# Patient Record
Sex: FEMALE | Race: WHITE | NOT HISPANIC OR LATINO | Employment: OTHER | URBAN - METROPOLITAN AREA
[De-identification: names, ages, dates, MRNs, and addresses within clinical notes are randomized per-mention and may not be internally consistent; named-entity substitution may affect disease eponyms.]

---

## 2021-05-04 ENCOUNTER — RECORDS - HEALTHEAST (OUTPATIENT)
Dept: LAB | Facility: CLINIC | Age: 86
End: 2021-05-04

## 2021-05-05 LAB
ANION GAP SERPL CALCULATED.3IONS-SCNC: 9 MMOL/L (ref 5–18)
BUN SERPL-MCNC: 34 MG/DL (ref 8–28)
CALCIUM SERPL-MCNC: 9.6 MG/DL (ref 8.5–10.5)
CHLORIDE BLD-SCNC: 103 MMOL/L (ref 98–107)
CO2 SERPL-SCNC: 31 MMOL/L (ref 22–31)
CREAT SERPL-MCNC: 0.81 MG/DL (ref 0.6–1.1)
ERYTHROCYTE [DISTWIDTH] IN BLOOD BY AUTOMATED COUNT: 12.4 % (ref 11–14.5)
GFR SERPL CREATININE-BSD FRML MDRD: >60 ML/MIN/1.73M2
GLUCOSE BLD-MCNC: 106 MG/DL (ref 70–125)
HBA1C MFR BLD: 5.7 %
HCT VFR BLD AUTO: 42 % (ref 35–47)
HGB BLD-MCNC: 13.5 G/DL (ref 12–16)
MCH RBC QN AUTO: 31.8 PG (ref 27–34)
MCHC RBC AUTO-ENTMCNC: 32.1 G/DL (ref 32–36)
MCV RBC AUTO: 99 FL (ref 80–100)
PLATELET # BLD AUTO: 255 THOU/UL (ref 140–440)
PMV BLD AUTO: 10 FL (ref 8.5–12.5)
POTASSIUM BLD-SCNC: 4 MMOL/L (ref 3.5–5)
RBC # BLD AUTO: 4.24 MILL/UL (ref 3.8–5.4)
SODIUM SERPL-SCNC: 143 MMOL/L (ref 136–145)
WBC: 5.8 THOU/UL (ref 4–11)

## 2021-05-25 ENCOUNTER — RECORDS - HEALTHEAST (OUTPATIENT)
Dept: ADMINISTRATIVE | Facility: CLINIC | Age: 86
End: 2021-05-25

## 2021-05-26 ENCOUNTER — RECORDS - HEALTHEAST (OUTPATIENT)
Dept: ADMINISTRATIVE | Facility: CLINIC | Age: 86
End: 2021-05-26

## 2021-05-27 ENCOUNTER — RECORDS - HEALTHEAST (OUTPATIENT)
Dept: ADMINISTRATIVE | Facility: CLINIC | Age: 86
End: 2021-05-27

## 2021-10-08 ENCOUNTER — HOSPITAL ENCOUNTER (EMERGENCY)
Facility: HOSPITAL | Age: 86
Discharge: HOME | End: 2021-10-09
Attending: EMERGENCY MEDICINE | Admitting: EMERGENCY MEDICINE
Payer: COMMERCIAL

## 2021-10-08 ENCOUNTER — APPOINTMENT (OUTPATIENT)
Dept: RADIOLOGY | Facility: HOSPITAL | Age: 86
End: 2021-10-08
Attending: EMERGENCY MEDICINE
Payer: COMMERCIAL

## 2021-10-08 ENCOUNTER — APPOINTMENT (OUTPATIENT)
Dept: CT IMAGING | Facility: HOSPITAL | Age: 86
End: 2021-10-08
Attending: EMERGENCY MEDICINE
Payer: COMMERCIAL

## 2021-10-08 DIAGNOSIS — S32.000A COMPRESSION FRACTURE OF LUMBAR VERTEBRA, UNSPECIFIED LUMBAR VERTEBRAL LEVEL, INITIAL ENCOUNTER (H): ICD-10-CM

## 2021-10-08 DIAGNOSIS — R29.6 FREQUENT FALLS: ICD-10-CM

## 2021-10-08 LAB
ANION GAP SERPL CALCULATED.3IONS-SCNC: 11 MMOL/L (ref 5–18)
APTT PPP: 33 SECONDS (ref 22–38)
BUN SERPL-MCNC: 28 MG/DL (ref 8–28)
CALCIUM SERPL-MCNC: 9.5 MG/DL (ref 8.5–10.5)
CHLORIDE BLD-SCNC: 102 MMOL/L (ref 98–107)
CO2 SERPL-SCNC: 29 MMOL/L (ref 22–31)
CREAT SERPL-MCNC: 0.85 MG/DL (ref 0.6–1.1)
ERYTHROCYTE [DISTWIDTH] IN BLOOD BY AUTOMATED COUNT: 12 % (ref 10–15)
GFR SERPL CREATININE-BSD FRML MDRD: 62 ML/MIN/1.73M2
GLUCOSE BLD-MCNC: 111 MG/DL (ref 70–125)
HCT VFR BLD AUTO: 39 % (ref 35–47)
HGB BLD-MCNC: 12.5 G/DL (ref 11.7–15.7)
INR PPP: 1 (ref 0.9–1.15)
MCH RBC QN AUTO: 33.2 PG (ref 26.5–33)
MCHC RBC AUTO-ENTMCNC: 32.1 G/DL (ref 31.5–36.5)
MCV RBC AUTO: 103 FL (ref 78–100)
PLATELET # BLD AUTO: 266 10E3/UL (ref 150–450)
POTASSIUM BLD-SCNC: 4.4 MMOL/L (ref 3.5–5)
RBC # BLD AUTO: 3.77 10E6/UL (ref 3.8–5.2)
SODIUM SERPL-SCNC: 142 MMOL/L (ref 136–145)
WBC # BLD AUTO: 8.3 10E3/UL (ref 4–11)

## 2021-10-08 PROCEDURE — 85610 PROTHROMBIN TIME: CPT | Performed by: EMERGENCY MEDICINE

## 2021-10-08 PROCEDURE — 36415 COLL VENOUS BLD VENIPUNCTURE: CPT | Performed by: EMERGENCY MEDICINE

## 2021-10-08 PROCEDURE — 99285 EMERGENCY DEPT VISIT HI MDM: CPT | Mod: 25

## 2021-10-08 PROCEDURE — 80048 BASIC METABOLIC PNL TOTAL CA: CPT | Performed by: EMERGENCY MEDICINE

## 2021-10-08 PROCEDURE — 85730 THROMBOPLASTIN TIME PARTIAL: CPT | Performed by: EMERGENCY MEDICINE

## 2021-10-08 PROCEDURE — 73610 X-RAY EXAM OF ANKLE: CPT | Mod: RT

## 2021-10-08 PROCEDURE — 70450 CT HEAD/BRAIN W/O DYE: CPT

## 2021-10-08 PROCEDURE — 72192 CT PELVIS W/O DYE: CPT

## 2021-10-08 PROCEDURE — 72125 CT NECK SPINE W/O DYE: CPT

## 2021-10-08 PROCEDURE — 72131 CT LUMBAR SPINE W/O DYE: CPT

## 2021-10-08 PROCEDURE — 73502 X-RAY EXAM HIP UNI 2-3 VIEWS: CPT

## 2021-10-08 PROCEDURE — 85041 AUTOMATED RBC COUNT: CPT | Performed by: EMERGENCY MEDICINE

## 2021-10-08 ASSESSMENT — MIFFLIN-ST. JEOR: SCORE: 1157.1

## 2021-10-08 NOTE — ED PROVIDER NOTES
"  Emergency Department Encounter     Evaluation Date & Time:   10/8/2021  4:14 PM    CHIEF COMPLAINT:  Fall and Right hip pain      Triage Note:Pt arrives from [facility] via [facility]. Per medics staff heard fall and came to pt's side right away. Pt was supine with \"head against wall\" and \"alert to baseline\" per staff. Pt c/o of right hip pain. Pt is not on thinners per medics. Fall was around 1500      Impression and Plan       FINAL IMPRESSION:    ICD-10-CM    1. Frequent falls  R29.6    2. Compression fracture of lumbar vertebra, unspecified lumbar vertebral level, initial encounter (H)  S32.000A          ED COURSE & MEDICAL DECISION MAKIN:28 PM Met with patient for initial interview and exam. Discussed initial plan for care for their stay in the emergency department.  9:06 PM Consulted with Dr. Kim, general surgery.    87 year old female, history of Alzheimer's dementia with behavioral disturbance, HTN, gastric ulcer and osteopenia, who presents via EMS from her memory care facility for evaluation after a fall.  Patient unable to provide any history secondary to dementia. Per EMS, staff at her facility heard her fall ~1 hour ago. They went to help her immediately and it did not appear as if she lost consciousness and she was at her baseline mental status. Her behavior suggested right hip pain. She is not on any blood thinners.    On exam, no signs of injury. Grossly non-focal neuro exam, however she is not cooperative with complete neuro exam. She has midline tenderness to palpation of entire cervical spine.    Precautionary head CT performed and demonstrated no evidence for acute intracranial process with brain atrophy and presumed chronic microvascular ischemic changes.  Cervical spine CT negative for fracture and posttraumatic subluxation with no high-grade spinal canal or neural foraminal stenosis.    Right hip x-ray negative for fracture and dislocation with osteopenia.      After negative " imaging, we attempted to ambulate the patient, however she would not walk.  Noncontrast CT of the pelvis performed to evaluate for occult fracture and was negative.    Labs remarkable for no leukocytosis, anemia, significant electrolyte derangements or renal impairment.  Rosi MAGUIRE    RN spoke with staff at her facility who reported that since her fall on Monday, she has not been ambulatory, however had been walking prior to that fall.  If her evaluation is negative, they are comfortable taking the patient back.    Given that patient has not been ambulatory since her fall, will obtain CT of the lumbar spine as well as BL ankle x-rays, which are pending at time of shift change.    Patient signed out to Dr. Carvajal at time of shift change to review remaining imaging studies.  If these images are negative, patient to be transferred back to her memory care facility with follow-up PMD.  Patient has been stable thus far in ED course.        MEDICATIONS GIVEN IN THE EMERGENCY DEPARTMENT:  Medications - No data to display    NEW PRESCRIPTIONS STARTED AT TODAY'S ED VISIT:  There are no discharge medications for this patient.      HPI     Shea León is a 87 year old female with a pertinent history of Alzheimer's dementia with behavioral disturbance, HTN, gastric ulcer and osteopenia, who presents to this ED via EMS from her [facility] care facility for evaluation of hip pain after a fall.    HPI limited secondary to dementia.    Per EMS, patient arrives from her memory care facility where staff heard her fall around 3 PM (~1 hour ago). Staff assisted her immediately got to her right away and it did not appear as if she lost consciousness, and she was at her mental baseline. She did complain of right hip pain at this time. She is not on any blood thinners.    Patient also had a fall on Monday and she has not been ambulating since the fall - had been ambulatory prior to that fall.     REVIEW OF SYSTEMS:  ROS limited  "secondary to dementia.      Medical History     No past medical history on file.    No past surgical history on file.    No family history on file.    Social History     Tobacco Use     Smoking status: Not on file   Substance Use Topics     Alcohol use: Not on file     Drug use: Not on file       No current outpatient medications on file.      Physical Exam     First Vitals:  Patient Vitals for the past 24 hrs:   BP Temp Temp src Pulse Resp SpO2 Height Weight   10/09/21 0045 107/73 -- -- -- -- -- -- --   10/08/21 2145 -- -- -- 84 -- 96 % -- --   10/08/21 2100 -- -- -- 77 -- 96 % -- --   10/08/21 1915 -- -- -- 74 -- 97 % -- --   10/08/21 1900 -- -- -- 74 -- 97 % -- --   10/08/21 1845 -- -- -- 78 -- 97 % -- --   10/08/21 1830 -- -- -- 81 -- 97 % -- --   10/08/21 1619 (!) 145/76 97.7  F (36.5  C) Oral 65 16 97 % 1.753 m (5' 9\") 65.8 kg (145 lb)       PHYSICAL EXAM:   Physical Exam    GENERAL: Awake, alert.  In no acute distress.   HEENT: Normocephalic, atraumatic. Pupils equal, round and reactive. Conjunctiva normal.  NECK: There is midline tenderness to palpation of entire cervical spine. No stridor.  PULMONARY: Chest is atraumatic and non-tender to palpation. Symmetrical breath sounds without distress.  Lungs clear to auscultation bilaterally without wheezes, rhonchi or rales.  CARDIO: Regular rate and rhythm.  No significant murmur, rub or gallop.  Radial and pedal pulses strong and symmetrical.  ABDOMINAL: Abdomen atraumatic, soft, non-distended and non-tender to palpation.  No CVAT, BL.  BACK:  Back is atraumatic. No midline tenderness to palpation of thoracic and lumbar spines.   EXTREMITIES: No lower extremity swelling or edema.      NEURO: Alert and oriented to person.  Patient not cooperative with neuro exam.  Cranial nerves grossly intact.  No focal motor deficit.  PSYCH: Pleasant, not combative.   SKIN: No rashes.     Results     LAB:  All pertinent labs reviewed and interpreted  Labs Ordered and Resulted " from Time of ED Arrival Up to the Time of Departure from the ED   CBC WITH PLATELETS - Abnormal; Notable for the following components:       Result Value    RBC Count 3.77 (*)      (*)     MCH 33.2 (*)     All other components within normal limits   BASIC METABOLIC PANEL - Normal   INR - Normal   PARTIAL THROMBOPLASTIN TIME - Normal   MAY SALINE LOCK IV       RADIOLOGY:  XR Ankle Left G/E 3 Views   Final Result   IMPRESSION: Bones appear osteopenic. No fracture. No dislocation. Mild soft tissue swelling. Severe arterial calcification.      Ankle XR, G/E 3 views, right   Final Result   IMPRESSION: Bones are osteopenic. No fracture. Normal alignment. Severe arterial calcification.      Lumbar spine CT w/o contrast   Final Result   IMPRESSION:   1.  Mild acute/early subacute compression fracture at the superior endplate of L2 with less than 20% loss of height.   2.  Grade 1 degenerative anterolisthesis of L4 on L5 with moderate central canal stenosis.      CT Pelvis Bone wo Contrast   Final Result   IMPRESSION:   1.  Right hip is negative for fracture.      XR Pelvis w Hip Right G/E 2 Views   Final Result   IMPRESSION: No fracture or dislocation. Osteopenia. Mild degenerative changes in the right hip.      Cervical spine CT w/o contrast   Final Result   IMPRESSION:   1.  No fracture or posttraumatic subluxation.   2.  No high grade spinal canal or neural foraminal stenosis.         Head CT w/o contrast   Final Result   IMPRESSION:   1.  No CT evidence for acute intracranial process.   2.  Brain atrophy and presumed chronic microvascular ischemic changes as above.            I, Valeria Borja, am serving as a scribe to document services personally performed by Susan Leal MD based on my observation and the provider's statements to me. I, Susan Leal MD attest that Valeria Borja is acting in a scribe capacity, has observed my performance of the services and has documented them in accordance with my  direction.    Susan Leal MD  Emergency Medicine  [facility] EMERGENCY DEPARTMENT         Susan Leal MD  10/09/21 0992

## 2021-10-08 NOTE — ED TRIAGE NOTES
"Pt arrives from [facility] via [facility]. Per medics staff heard fall and came to pt's side right away. Pt was supine with \"head against wall\" and \"alert to baseline\" per staff. Pt c/o of right hip pain. Pt is not on thinners per medics. Fall was around 1500  "

## 2021-10-09 VITALS
HEIGHT: 69 IN | BODY MASS INDEX: 21.48 KG/M2 | HEART RATE: 84 BPM | SYSTOLIC BLOOD PRESSURE: 107 MMHG | OXYGEN SATURATION: 96 % | RESPIRATION RATE: 16 BRPM | DIASTOLIC BLOOD PRESSURE: 73 MMHG | WEIGHT: 145 LBS | TEMPERATURE: 97.7 F

## 2021-10-09 NOTE — ED NOTES
Called and spoke with Tess at [facility]. Tess reports that patient was ambulatory prior to her fall on Monday 10/4/21, since Monday NH staff has been using a lift device to help with transfers. Provider notified.

## 2021-10-09 NOTE — DISCHARGE INSTRUCTIONS
You were found to have a mild compression fracture of one of the bones in your low back.  Continue your previously prescribed pain medications and follow-up with your primary care doctor as an outpatient.  Return to the ER if you have any worsening symptoms or other concerns.

## 2021-10-09 NOTE — ED PROVIDER NOTES
10:45 PM: Assumed patient care.  Awaiting imaging results.  Plan will likely be for discharge home barring any major findings on her imaging results.  Her nursing home has already stated that they are willing to accept her back.    11:29 PM  The patient was found to have a small acute compression fracture.  She already has daily pain medications prescribed at her nursing facility.  She appears comfortable at rest and I feel that she can still be discharged back home with instructions for outpatient follow-up.        Imaging:  XR Pelvis w Hip Right G/E 2 Views    Result Date: 10/8/2021  EXAM: XR PELVIS AND HIP RIGHT 2 VIEWS LOCATION: [facility] DATE/TIME: 10/8/2021 5:35 PM INDICATION: ? right hip pain after fall COMPARISON: None.     IMPRESSION: No fracture or dislocation. Osteopenia. Mild degenerative changes in the right hip.    XR Ankle Left G/E 3 Views    Result Date: 10/8/2021  EXAM: XR ANKLE LEFT G/E 3 VIEWS LOCATION: [facility] DATE/TIME: 10/8/2021 10:43 PM INDICATION: frequent falls, now not walking COMPARISON: None.     IMPRESSION: Bones appear osteopenic. No fracture. No dislocation. Mild soft tissue swelling. Severe arterial calcification.    Ankle XR, G/E 3 views, right    Result Date: 10/8/2021  EXAM: XR ANKLE RIGHT G/E 3 VIEWS LOCATION: [facility] DATE/TIME: 10/8/2021 10:43 PM INDICATION: frequent falls, now not walking COMPARISON: None.     IMPRESSION: Bones are osteopenic. No fracture. Normal alignment. Severe arterial calcification.    Cervical spine CT w/o contrast    Result Date: 10/8/2021  EXAM: CT CERVICAL SPINE W/O CONTRAST LOCATION: [facility] DATE/TIME: 10/8/2021 5:18 PM INDICATION: Fall. Trauma. Pain. COMPARISON: None. TECHNIQUE: Routine CT Cervical Spine without IV contrast. Multiplanar reformats. Dose reduction techniques were used. FINDINGS: VERTEBRA: There is minimal degenerative  retrolisthesis of C2 upon C3 and C6 upon C7. There is minimal degenerative anterolisthesis of C4 upon C5, C5 upon C6, and C7 upon T1. Vertebral body heights normal. No fracture or posttraumatic subluxation. Moderate facet arthropathy throughout the cervical spine. No prevertebral soft tissue swelling. CANAL/FORAMINA: No canal or neural foraminal stenosis. PARASPINAL: No extraspinal abnormality.     IMPRESSION: 1.  No fracture or posttraumatic subluxation. 2.  No high grade spinal canal or neural foraminal stenosis.     Head CT w/o contrast    Result Date: 10/8/2021  EXAM: CT HEAD W/O CONTRAST LOCATION: [facility] DATE/TIME: 10/8/2021 5:18 PM INDICATION: Fall. Head trauma. Pain. COMPARISON: Head CT 10/11/2016. TECHNIQUE: Routine CT Head without IV contrast. Multiplanar reformats. Dose reduction techniques were used. FINDINGS: INTRACRANIAL CONTENTS: No intracranial hemorrhage, extraaxial collection, or mass effect. No CT evidence of acute infarct. Moderate presumed chronic small vessel ischemic changes. Moderate generalized volume loss. No hydrocephalus. VISUALIZED ORBITS/SINUSES/MASTOIDS: No intraorbital abnormality. No paranasal sinus mucosal disease. No middle ear or mastoid effusion. BONES/SOFT TISSUES: No acute abnormality.     IMPRESSION: 1.  No CT evidence for acute intracranial process. 2.  Brain atrophy and presumed chronic microvascular ischemic changes as above.    Lumbar spine CT w/o contrast    Result Date: 10/8/2021  EXAM: CT LUMBAR SPINE W/O CONTRAST LOCATION: [facility] DATE/TIME: 10/8/2021 10:19 PM INDICATION: Frequent falls. Now cannot walk COMPARISON: Pelvic CT 10/8/2021 TECHNIQUE: Routine CT Lumbar Spine without IV contrast. Multiplanar reformats. Dose reduction techniques were used. FINDINGS: VERTEBRA: 5 lumbar type vertebra. There is a mild compression deformity at the superior endplate of L2 with less than 20% loss of height. This appears recent.  Vertebral body height is otherwise normal. 8mm degenerative anterolisthesis of L4 on L5. Alignment is otherwise preserved. No evidence for acute subluxation. CANAL/FORAMINA: Moderate central canal stenosis at L4-5. No gross high-grade neural foraminal narrowing. PARASPINAL: No extraspinal abnormality.     IMPRESSION: 1.  Mild acute/early subacute compression fracture at the superior endplate of L2 with less than 20% loss of height. 2.  Grade 1 degenerative anterolisthesis of L4 on L5 with moderate central canal stenosis.    CT Pelvis Bone wo Contrast    Result Date: 10/8/2021  EXAM: CT PELVIS BONE WO CONTRAST LOCATION: [facility] DATE/TIME: 10/8/2021 7:20 PM INDICATION: Fall. Injury with right hip pain. COMPARISON: X-ray 10/08/2021. TECHNIQUE: CT scan of the pelvis was performed without IV contrast. Multiplanar reformats were obtained. Dose reduction techniques were used. CONTRAST: None. FINDINGS: PELVIS ORGANS: Low lying right kidney. MUSCULOSKELETAL: No fracture or dislocation. Degenerative changes in the lumbar spine with mild anterior subluxation L4 on L5.     IMPRESSION: 1.  Right hip is negative for fracture.        Diagnosis:  1. Frequent falls    2. Compression fracture of lumbar vertebra, unspecified lumbar vertebral level, initial encounter (H)             Abelino Carvajal MD  10/08/21 [id number]

## 2021-10-14 ENCOUNTER — DOCUMENTATION ONLY (OUTPATIENT)
Dept: OTHER | Facility: CLINIC | Age: 86
End: 2021-10-14